# Patient Record
Sex: FEMALE | Race: WHITE | NOT HISPANIC OR LATINO | URBAN - METROPOLITAN AREA
[De-identification: names, ages, dates, MRNs, and addresses within clinical notes are randomized per-mention and may not be internally consistent; named-entity substitution may affect disease eponyms.]

---

## 2019-08-16 NOTE — PATIENT DISCUSSION
Patient does not present w/ any significant drooping of either upper eyelid today. Explained that insurance would not cover a surgical procedure at this time. Patient expressed understanding. Follow up if drooping worsens.

## 2022-09-14 ENCOUNTER — NEW PATIENT (OUTPATIENT)
Dept: URBAN - METROPOLITAN AREA CLINIC 20 | Facility: CLINIC | Age: 57
End: 2022-09-14

## 2022-09-14 DIAGNOSIS — H02.88A: ICD-10-CM

## 2022-09-14 DIAGNOSIS — H04.123: ICD-10-CM

## 2022-09-14 DIAGNOSIS — H02.88B: ICD-10-CM

## 2022-09-14 DIAGNOSIS — H52.03: ICD-10-CM

## 2022-09-14 PROCEDURE — 92004 COMPRE OPH EXAM NEW PT 1/>: CPT

## 2022-09-14 PROCEDURE — 92015 DETERMINE REFRACTIVE STATE: CPT

## 2022-09-14 ASSESSMENT — VISUAL ACUITY
OS_CC: 20/25
OU_CC: J1
OD_CC: 20/30-2
OU_CC: 20/25

## 2022-09-14 ASSESSMENT — TONOMETRY
OS_IOP_MMHG: 8
OD_IOP_MMHG: 10

## 2022-09-14 ASSESSMENT — KERATOMETRY
OD_K1POWER_DIOPTERS: 43.00
OD_AXISANGLE2_DEGREES: 75
OD_AXISANGLE_DEGREES: 165
OS_K1POWER_DIOPTERS: 43.25
OS_AXISANGLE2_DEGREES: 99
OD_K2POWER_DIOPTERS: 43.50
OS_K2POWER_DIOPTERS: 44.00
OS_AXISANGLE_DEGREES: 9